# Patient Record
Sex: MALE | Race: WHITE
[De-identification: names, ages, dates, MRNs, and addresses within clinical notes are randomized per-mention and may not be internally consistent; named-entity substitution may affect disease eponyms.]

---

## 2019-08-09 ENCOUNTER — HOSPITAL ENCOUNTER (EMERGENCY)
Dept: HOSPITAL 63 - ER | Age: 24
Discharge: HOME | End: 2019-08-09
Payer: SELF-PAY

## 2019-08-09 VITALS — HEIGHT: 73 IN | WEIGHT: 185 LBS | BODY MASS INDEX: 24.52 KG/M2

## 2019-08-09 VITALS — SYSTOLIC BLOOD PRESSURE: 131 MMHG | DIASTOLIC BLOOD PRESSURE: 78 MMHG

## 2019-08-09 DIAGNOSIS — J45.901: Primary | ICD-10-CM

## 2019-08-09 DIAGNOSIS — Z87.891: ICD-10-CM

## 2019-08-09 DIAGNOSIS — F12.90: ICD-10-CM

## 2019-08-09 DIAGNOSIS — E87.6: ICD-10-CM

## 2019-08-09 LAB
ALBUMIN SERPL-MCNC: 4 G/DL (ref 3.4–5)
ALP SERPL-CCNC: 62 U/L (ref 46–116)
ALT SERPL-CCNC: 24 U/L (ref 16–63)
AMPHETAMINE/METHAMPHETAMINE: (no result)
ANION GAP SERPL CALC-SCNC: 9 MMOL/L (ref 6–14)
APTT PPP: YELLOW S
AST SERPL-CCNC: 16 U/L (ref 15–37)
BACTERIA #/AREA URNS HPF: 0 /HPF
BARBITURATES UR-MCNC: (no result) UG/ML
BASOPHILS # BLD AUTO: 0 X10^3/UL (ref 0–0.2)
BASOPHILS NFR BLD: 0 % (ref 0–3)
BENZODIAZ UR-MCNC: (no result) UG/L
BILIRUB DIRECT SERPL-MCNC: 0.1 MG/DL (ref 0–0.2)
BILIRUB SERPL-MCNC: 0.4 MG/DL (ref 0.2–1)
BILIRUB UR QL STRIP: (no result)
CA-I SERPL ISE-MCNC: 15 MG/DL (ref 8–26)
CALCIUM SERPL-MCNC: 9 MG/DL (ref 8.5–10.1)
CANNABINOIDS UR-MCNC: (no result) UG/L
CHLORIDE SERPL-SCNC: 102 MMOL/L (ref 98–107)
CO2 SERPL-SCNC: 25 MMOL/L (ref 21–32)
COCAINE UR-MCNC: (no result) NG/ML
CREAT SERPL-MCNC: 1.1 MG/DL (ref 0.7–1.3)
EOSINOPHIL NFR BLD: 0.1 X10^3/UL (ref 0–0.7)
EOSINOPHIL NFR BLD: 1 % (ref 0–3)
ERYTHROCYTE [DISTWIDTH] IN BLOOD BY AUTOMATED COUNT: 13.5 % (ref 11.5–14.5)
FIBRINOGEN PPP-MCNC: CLEAR MG/DL
GFR SERPLBLD BASED ON 1.73 SQ M-ARVRAT: 83.7 ML/MIN
GLUCOSE SERPL-MCNC: 137 MG/DL (ref 70–99)
GLUCOSE UR STRIP-MCNC: (no result) MG/DL
HCT VFR BLD CALC: 42.7 % (ref 39–53)
HGB BLD-MCNC: 14.4 G/DL (ref 13–17.5)
LIPASE: 63 U/L (ref 73–393)
LYMPHOCYTES # BLD: 1.2 X10^3/UL (ref 1–4.8)
LYMPHOCYTES NFR BLD AUTO: 17 % (ref 24–48)
MAGNESIUM SERPL-MCNC: 1.8 MG/DL (ref 1.8–2.4)
MCH RBC QN AUTO: 31 PG (ref 25–35)
MCHC RBC AUTO-ENTMCNC: 34 G/DL (ref 31–37)
MCV RBC AUTO: 90 FL (ref 79–100)
METHADONE SERPL-MCNC: (no result) NG/ML
MONO #: 0.7 X10^3/UL (ref 0–1.1)
MONOCYTES NFR BLD: 9 % (ref 0–9)
NEUT #: 5.3 X10^3UL (ref 1.8–7.7)
NEUTROPHILS NFR BLD AUTO: 73 % (ref 31–73)
NITRITE UR QL STRIP: (no result)
OPIATES UR-MCNC: (no result) NG/ML
PCP SERPL-MCNC: (no result) MG/DL
PLATELET # BLD AUTO: 192 X10^3/UL (ref 140–400)
POTASSIUM SERPL-SCNC: 3.1 MMOL/L (ref 3.5–5.1)
PROT SERPL-MCNC: 7.2 G/DL (ref 6.4–8.2)
RBC # BLD AUTO: 4.73 X10^6/UL (ref 4.3–5.7)
RBC #/AREA URNS HPF: 0 /HPF (ref 0–2)
SODIUM SERPL-SCNC: 136 MMOL/L (ref 136–145)
SP GR UR STRIP: 1.01
SQUAMOUS #/AREA URNS LPF: (no result) /LPF
UROBILINOGEN UR-MCNC: 0.2 MG/DL
WBC # BLD AUTO: 7.2 X10^3/UL (ref 4–11)
WBC #/AREA URNS HPF: 0 /HPF (ref 0–4)

## 2019-08-09 PROCEDURE — 83880 ASSAY OF NATRIURETIC PEPTIDE: CPT

## 2019-08-09 PROCEDURE — 82550 ASSAY OF CK (CPK): CPT

## 2019-08-09 PROCEDURE — 85025 COMPLETE CBC W/AUTO DIFF WBC: CPT

## 2019-08-09 PROCEDURE — 96372 THER/PROPH/DIAG INJ SC/IM: CPT

## 2019-08-09 PROCEDURE — 93005 ELECTROCARDIOGRAM TRACING: CPT

## 2019-08-09 PROCEDURE — 80307 DRUG TEST PRSMV CHEM ANLYZR: CPT

## 2019-08-09 PROCEDURE — 85610 PROTHROMBIN TIME: CPT

## 2019-08-09 PROCEDURE — 96374 THER/PROPH/DIAG INJ IV PUSH: CPT

## 2019-08-09 PROCEDURE — 94640 AIRWAY INHALATION TREATMENT: CPT

## 2019-08-09 PROCEDURE — 36415 COLL VENOUS BLD VENIPUNCTURE: CPT

## 2019-08-09 PROCEDURE — 81001 URINALYSIS AUTO W/SCOPE: CPT

## 2019-08-09 PROCEDURE — 80048 BASIC METABOLIC PNL TOTAL CA: CPT

## 2019-08-09 PROCEDURE — 99285 EMERGENCY DEPT VISIT HI MDM: CPT

## 2019-08-09 PROCEDURE — 85379 FIBRIN DEGRADATION QUANT: CPT

## 2019-08-09 PROCEDURE — 80076 HEPATIC FUNCTION PANEL: CPT

## 2019-08-09 PROCEDURE — 71046 X-RAY EXAM CHEST 2 VIEWS: CPT

## 2019-08-09 PROCEDURE — 83690 ASSAY OF LIPASE: CPT

## 2019-08-09 PROCEDURE — 85730 THROMBOPLASTIN TIME PARTIAL: CPT

## 2019-08-09 PROCEDURE — 83735 ASSAY OF MAGNESIUM: CPT

## 2019-08-09 PROCEDURE — 84484 ASSAY OF TROPONIN QUANT: CPT

## 2019-08-09 NOTE — ED.ADGEN
Past History


Past Medical History:  Asthma





Adult General


Chief Complaint


Chief Complaint


".. I recently quit smoking.. but I ve gotten more short of breath.. the last 

couple days... I have a hx of asthma.. but my inhaler .. does not seem to be 

working as well...."





HPI


HPI





Patient is a 24 year old male who presents with above hx and complaints 

increased wheezing and chest tightness last 48 hours. Patient does have a 

history of asthma.   Patient does not know what his best peak flow is. Patient 

recently stopped smoking. Patient denies any productive cough. No history of 

fever. No history of immunosuppression. No history of travel. Has not been on 

steroids recently.  Has had some upper nasal congestion which he attributed to 

allergies.  Patient reports increased tachycardia today.  Patient denies any 

history of cardiac disorders.  No hx of previous admissions for his asthma.   

Pt.  does continue to smoke marijuana.





Review of Systems


Review of Systems





Constitutional: Denies fever or chills []


Eyes: Denies change in visual acuity, redness, or eye pain []


HENT: History of nasal congestion 


Respiratory: Plaints of cough or wheezing and shortness of breath []


Cardiovascular: No additional information not addressed in HPI []


GI: Denies abdominal pain, nausea, vomiting, bloody stools or diarrhea []


: Denies dysuria or hematuria []


Musculoskeletal: Denies back pain or joint pain []


Integument: Denies rash or skin lesions []


Neurologic: Denies headache, focal weakness or sensory changes []


Endocrine: Denies polyuria or polydipsia []





All other systems were reviewed and found to be within normal limits, except as 

documented in this note.





Family History


Family History


Noncontributory





Current Medications


Current Medications





Current Medications








 Medications


  (Trade)  Dose


 Ordered  Sig/Tomasz  Start Time


 Stop Time Status Last Admin


Dose Admin


 


 Albuterol Sulfate


  (Ventolin Hfa


 Inhaler)  60 puff  STK-MED ONCE  19 23:21


 19 23:21 DC  





 


 Albuterol/


 Ipratropium


  (Duoneb)  3 ml  1X  ONCE  19 21:30


 19 21:35 DC 19 22:02


3 ML


 


 Lactated Ringer's  1,000 ml @ 


 1,000 mls/hr  Q1H  19 21:29


 19 22:28 DC 19 21:50


1,000 MLS/HR


 


 Methylprednisolone


 Sodium Succinate


  (SOLU-Medrol


 125MG VIAL)  125 mg  1X  ONCE  19 21:30


 19 21:35 DC 19 21:50


125 MG


 


 Potassium Chloride


  (Klor-Con)  40 meq  1X  ONCE  19 23:15


 19 23:21 DC 19 23:13


40 MEQ











Allergies


Allergies





Allergies








Coded Allergies Type Severity Reaction Last Updated Verified


 


  No Known Drug Allergies    19 No











Physical Exam


Physical Exam





Constitutional: Well developed, well nourished, mild-to-moderate distress, non-

toxic appearance. []


HENT: Normocephalic, atraumatic, bilateral external ears normal, oropharynx 

moist, no oral exudates, nose swollen turbinates and clear rhinorrhea


Eyes: PERRLA, EOMI, conjunctiva normal, no discharge. [] 


Neck: Normal range of motion, no tenderness, supple, no stridor. [] 


Cardiovascular: Tachycardia Heart rate regular rhythm, no murmur []


Lungs & Thorax:  Bilateral breath sounds equal apex with scattered wheezes on 

Auscultation [].  No intercostal retractions. 


Abdomen: Bowel sounds normal, soft, no tenderness, no masses, no pulsatile 

masses. [] 


Skin: Warm, dry, no erythema, no rash. [] 


Back: No tenderness, no CVA tenderness. [] 


Extremities: No tenderness, no cyanosis, no clubbing, ROM intact, no edema. [] 


Neurologic: Alert and oriented X 3, normal motor function, normal sensory 

function, no focal deficits noted. []


Psychologic: Affect anxious, judgement normal, mood normal. []





Current Patient Data


Vital Signs





                                   Vital Signs








  Date Time  Temp Pulse Resp B/P (MAP) Pulse Ox O2 Delivery O2 Flow Rate FiO2


 


19 22:08      Room Air  


 


19 21:26 97.9 110 18  97   








Lab Results





                                Laboratory Tests








Test


 19


21:35 19


22:55


 


White Blood Count


 7.2 x10^3/uL


(4.0-11.0) 





 


Red Blood Count


 4.73 x10^6/uL


(4.30-5.70) 





 


Hemoglobin


 14.4 g/dL


(13.0-17.5) 





 


Hematocrit


 42.7 %


(39.0-53.0) 





 


Mean Corpuscular Volume


 90 fL ()


 





 


Mean Corpuscular Hemoglobin 31 pg (25-35)   


 


Mean Corpuscular Hemoglobin


Concent 34 g/dL


(31-37) 





 


Red Cell Distribution Width


 13.5 %


(11.5-14.5) 





 


Platelet Count


 192 x10^3/uL


(140-400) 





 


Neutrophils (%) (Auto) 73 % (31-73)   


 


Lymphocytes (%) (Auto) 17 % (24-48)  L 


 


Monocytes (%) (Auto) 9 % (0-9)   


 


Eosinophils (%) (Auto) 1 % (0-3)   


 


Basophils (%) (Auto) 0 % (0-3)   


 


Neutrophils # (Auto)


 5.3 x10^3uL


(1.8-7.7) 





 


Lymphocytes # (Auto)


 1.2 x10^3/uL


(1.0-4.8) 





 


Monocytes # (Auto)


 0.7 x10^3/uL


(0.0-1.1) 





 


Eosinophils # (Auto)


 0.1 x10^3/uL


(0.0-0.7) 





 


Basophils # (Auto)


 0.0 x10^3/uL


(0.0-0.2) 





 


Prothrombin Time


 10.8 SEC


(9.4-11.4) 





 


Prothrombin Time INR 1.0 (0.9-1.1)   


 


Activated Partial


Thromboplast Time 25 SEC (23-33)


 





 


D-Dimer (Cheryl)


 < 0.19 mg/L


(0.00-0.50) 





 


Sodium Level


 136 mmol/L


(136-145) 





 


Potassium Level


 3.1 mmol/L


(3.5-5.1)  L 





 


Chloride Level


 102 mmol/L


() 





 


Carbon Dioxide Level


 25 mmol/L


(21-32) 





 


Anion Gap 9 (6-14)   


 


Blood Urea Nitrogen


 15 mg/dL


(8-26) 





 


Creatinine


 1.1 mg/dL


(0.7-1.3) 





 


Estimated GFR


(Cockcroft-Gault) 83.7  


 





 


Glucose Level


 137 mg/dL


(70-99)  H 





 


Calcium Level


 9.0 mg/dL


(8.5-10.1) 





 


Magnesium Level


 1.8 mg/dL


(1.8-2.4) 





 


Total Bilirubin


 0.4 mg/dL


(0.2-1.0) 





 


Direct Bilirubin


 0.1 mg/dL


(0.0-0.2) 





 


Aspartate Amino Transferase


(AST) 16 U/L (15-37)


 





 


Alanine Aminotransferase (ALT)


 24 U/L (16-63)


 





 


Alkaline Phosphatase


 62 U/L


() 





 


Creatine Kinase


 121 U/L


() 





 


Troponin I Quantitative


 < 0.017 ng/mL


(0-0.055) 





 


NT-Pro-B-Type Natriuretic


Peptide 62 pg/mL


(0-124) 





 


Total Protein


 7.2 g/dL


(6.4-8.2) 





 


Albumin


 4.0 g/dL


(3.4-5.0) 





 


Lipase


 63 U/L


()  L 





 


Urine Collection Type  Unknown  


 


Urine Color  Yellow  


 


Urine Clarity  Clear  


 


Urine pH  6.5  


 


Urine Specific Gravity  1.010  


 


Urine Protein


 


 Neg


(NEG-TRACE)


 


Urine Glucose (UA)


 


 Neg mg/dL


(NEG)


 


Urine Ketones (Stick)


 


 Neg mg/dL


(NEG)


 


Urine Blood  Neg (NEG)  


 


Urine Nitrite  Neg (NEG)  


 


Urine Bilirubin  Neg (NEG)  


 


Urine Urobilinogen Dipstick


 


 0.2 mg/dL (0.2


mg/dL)


 


Urine Leukocyte Esterase  Neg (NEG)  


 


Urine RBC  0 /HPF (0-2)  


 


Urine WBC  0 /HPF (0-4)  


 


Urine Squamous Epithelial


Cells 


 Occ /LPF  





 


Urine Transitional Epithelial


Cells 


 Occ /LPF  





 


Urine Bacteria


 


 0 /HPF (0-FEW)





 


Urine Opiates Screen  Neg (NEG)  


 


Urine Methadone Screen  Neg (NEG)  


 


Urine Barbiturates  Neg (NEG)  


 


Urine Phencyclidine Screen  Neg (NEG)  


 


Urine


Amphetamine/Methamphetamine 


 Neg (NEG)  





 


Urine Benzodiazepines Screen  Neg (NEG)  


 


Urine Cocaine Screen  Neg (NEG)  


 


Urine Cannabinoids Screen  Pos (NEG)  


 


Urine Ethyl Alcohol  Neg (NEG)  











EKG


EKG


Interpretation EKG shows a sinus tachycardia 109 bpm. No findings acute STEMI. 

No finding[]





Radiology/Procedures


Radiology/Procedures


[][facility] [address], [place] [address] [phone]


                                        


                                 IMAGING REPORT





                                     Signed





PATIENT: KALYANI BILLS  ACCOUNT: [id number]     MRN#: [id number]


: 1995           LOCATION: ER              AGE: 24


SEX: M                    EXAM DT: 19         ACCESSION#: [id number]


STATUS: REG ER            ORD. PHYSICIAN: EDGAR GARCIA MD


REASON: Dyspnea, Hx asthma,recently quit smoking


PROCEDURE: CHEST PA & LATERAL





Chest radiograph 2019 9:44 PM


 


INDICATION: Dyspnea


 


COMPARISON: None available


 


TECHNIQUE: Frontal and lateral views of the chest are provided.


 


FINDINGS:


 


The cardiomediastinal silhouette is within normal limits.


 


There are no pleural effusions. There is no pulmonary vascular congestion.


There is no pneumothorax.


 


The lungs are clear.


 


No significant osseous abnormality is identified.


 


IMPRESSION:


 


No acute cardiopulmonary process.


 


Electronically signed by: Santiago Rojas MD (2019 10:52 PM) 


[facility]














DICTATED AND SIGNED BY:     SANTIAGO ROJAS MD


DATE:     19 1712





CC: EDGAR GARCIA MD; PCP,NO ~





Course & Med Decision Making


Course & Med Decision Making


Pertinent Labs and Imaging studies reviewed. (See chart for details)








Patient reports marked improvement of symptoms at time of discharge. Patient 

take prednisone 50 mg day for 5 days. Patient uses MDI 2 puffs 4 times a day. 

Patient follow-up primary care. Patient return of any concerns. Patient push 

fruit juices. Patient continues or persists in not smoking.





[]





Final Impression


Final Impression


1. Asthma exacerbation


2. Hypokalemia[]3.1


3. Marijuana use


4. Recently stopped smoking tobacco.





Dragon Disclaimer


Dragon Disclaimer


This electronic medical record was generated, in whole or in part, using a voice

 recognition dictation system.





Dragon Disclaimer


This chart was dictated in whole or in part using Voice Recognition software in 

a busy, high-work load, and often noisy Emergency Department environment.  It 

may contain unintended and wholly unrecognized errors or omissions.





Dragon Disclaimer


This chart was dictated in whole or in part using Voice Recognition software in 

a busy, high-work load, and often noisy Emergency Department environment.  It 

may contain unintended and wholly unrecognized errors or omissions.











EDGAR GARCIA MD            Aug 9, 2019 21:17

## 2019-08-09 NOTE — RAD
Chest radiograph 8/9/2019 9:44 PM

 

INDICATION: Dyspnea

 

COMPARISON: None available

 

TECHNIQUE: Frontal and lateral views of the chest are provided.

 

FINDINGS:

 

The cardiomediastinal silhouette is within normal limits.

 

There are no pleural effusions. There is no pulmonary vascular congestion.

There is no pneumothorax.

 

The lungs are clear.

 

No significant osseous abnormality is identified.

 

IMPRESSION:

 

No acute cardiopulmonary process.

 

Electronically signed by: Soumya Rojas MD (8/9/2019 10:52 PM) 

[facility]

## 2019-08-10 ENCOUNTER — HOSPITAL ENCOUNTER (EMERGENCY)
Dept: HOSPITAL 63 - ER | Age: 24
LOS: 1 days | Discharge: HOME | End: 2019-08-11
Payer: SELF-PAY

## 2019-08-10 VITALS — BODY MASS INDEX: 24.52 KG/M2 | HEIGHT: 73 IN | WEIGHT: 185 LBS

## 2019-08-10 VITALS — SYSTOLIC BLOOD PRESSURE: 157 MMHG | DIASTOLIC BLOOD PRESSURE: 84 MMHG

## 2019-08-10 DIAGNOSIS — F17.210: ICD-10-CM

## 2019-08-10 DIAGNOSIS — J45.901: Primary | ICD-10-CM

## 2019-08-10 DIAGNOSIS — E87.6: ICD-10-CM

## 2019-08-10 DIAGNOSIS — D72.829: ICD-10-CM

## 2019-08-10 DIAGNOSIS — F12.10: ICD-10-CM

## 2019-08-10 PROCEDURE — 96375 TX/PRO/DX INJ NEW DRUG ADDON: CPT

## 2019-08-10 PROCEDURE — 85730 THROMBOPLASTIN TIME PARTIAL: CPT

## 2019-08-10 PROCEDURE — 84484 ASSAY OF TROPONIN QUANT: CPT

## 2019-08-10 PROCEDURE — 80048 BASIC METABOLIC PNL TOTAL CA: CPT

## 2019-08-10 PROCEDURE — 85610 PROTHROMBIN TIME: CPT

## 2019-08-10 PROCEDURE — 99285 EMERGENCY DEPT VISIT HI MDM: CPT

## 2019-08-10 PROCEDURE — 82550 ASSAY OF CK (CPK): CPT

## 2019-08-10 PROCEDURE — 80307 DRUG TEST PRSMV CHEM ANLYZR: CPT

## 2019-08-10 PROCEDURE — 80076 HEPATIC FUNCTION PANEL: CPT

## 2019-08-10 PROCEDURE — 36415 COLL VENOUS BLD VENIPUNCTURE: CPT

## 2019-08-10 PROCEDURE — 96365 THER/PROPH/DIAG IV INF INIT: CPT

## 2019-08-10 PROCEDURE — 85379 FIBRIN DEGRADATION QUANT: CPT

## 2019-08-10 PROCEDURE — 93005 ELECTROCARDIOGRAM TRACING: CPT

## 2019-08-10 PROCEDURE — 85025 COMPLETE CBC W/AUTO DIFF WBC: CPT

## 2019-08-10 PROCEDURE — 94640 AIRWAY INHALATION TREATMENT: CPT

## 2019-08-10 PROCEDURE — 81001 URINALYSIS AUTO W/SCOPE: CPT

## 2019-08-10 PROCEDURE — 83690 ASSAY OF LIPASE: CPT

## 2019-08-10 PROCEDURE — 83735 ASSAY OF MAGNESIUM: CPT

## 2019-08-10 PROCEDURE — 83880 ASSAY OF NATRIURETIC PEPTIDE: CPT

## 2019-08-10 NOTE — EKG
[facility] [address], [place] [address]

Test Date:    2019               Test Time:    21:27:32

Pat Name:     KALYANI BILLS            Department:   

Patient ID:   [id number]           Room:          

Gender:       M                        Technician:   EMILI

:          1995               Requested By: EDGAR GARCIA

Order Number: [id number]            Reading MD:   Priyank Upton MD

                                 Measurements

Intervals                              Axis          

Rate:         109                      P:            43

HI:           154                      QRS:          75

QRSD:         100                      T:            24

QT:           306                                    

QTc:          414                                    

                           Interpretive Statements

SINUS TACHYCARDIA



Electronically Signed On 2019 7:50:44 CDT by Priyank Upton MD

## 2019-08-11 ENCOUNTER — HOSPITAL ENCOUNTER (INPATIENT)
Dept: HOSPITAL 63 - ER | Age: 24
LOS: 1 days | Discharge: HOME | DRG: 392 | End: 2019-08-12
Attending: INTERNAL MEDICINE | Admitting: INTERNAL MEDICINE
Payer: SELF-PAY

## 2019-08-11 VITALS — SYSTOLIC BLOOD PRESSURE: 126 MMHG | DIASTOLIC BLOOD PRESSURE: 74 MMHG

## 2019-08-11 VITALS — BODY MASS INDEX: 23.23 KG/M2 | HEIGHT: 73 IN | WEIGHT: 175.25 LBS

## 2019-08-11 VITALS — DIASTOLIC BLOOD PRESSURE: 70 MMHG | SYSTOLIC BLOOD PRESSURE: 116 MMHG

## 2019-08-11 VITALS — SYSTOLIC BLOOD PRESSURE: 124 MMHG | DIASTOLIC BLOOD PRESSURE: 73 MMHG

## 2019-08-11 DIAGNOSIS — K21.9: ICD-10-CM

## 2019-08-11 DIAGNOSIS — J45.901: ICD-10-CM

## 2019-08-11 DIAGNOSIS — K29.70: Primary | ICD-10-CM

## 2019-08-11 DIAGNOSIS — G43.A0: ICD-10-CM

## 2019-08-11 DIAGNOSIS — F42.8: ICD-10-CM

## 2019-08-11 DIAGNOSIS — F12.90: ICD-10-CM

## 2019-08-11 DIAGNOSIS — F41.9: ICD-10-CM

## 2019-08-11 DIAGNOSIS — F90.9: ICD-10-CM

## 2019-08-11 DIAGNOSIS — Z87.891: ICD-10-CM

## 2019-08-11 DIAGNOSIS — F31.9: ICD-10-CM

## 2019-08-11 LAB
ALBUMIN SERPL-MCNC: 3.9 G/DL (ref 3.4–5)
ALBUMIN SERPL-MCNC: 4.2 G/DL (ref 3.4–5)
ALP SERPL-CCNC: 58 U/L (ref 46–116)
ALP SERPL-CCNC: 63 U/L (ref 46–116)
ALT SERPL-CCNC: 26 U/L (ref 16–63)
ALT SERPL-CCNC: 28 U/L (ref 16–63)
AMPHETAMINE/METHAMPHETAMINE: (no result)
ANION GAP SERPL CALC-SCNC: 10 MMOL/L (ref 6–14)
ANION GAP SERPL CALC-SCNC: 11 MMOL/L (ref 6–14)
APTT PPP: (no result) S
AST SERPL-CCNC: 11 U/L (ref 15–37)
AST SERPL-CCNC: 12 U/L (ref 15–37)
BACTERIA #/AREA URNS HPF: (no result) /HPF
BARBITURATES UR-MCNC: (no result) UG/ML
BASOPHILS # BLD AUTO: 0 X10^3/UL (ref 0–0.2)
BASOPHILS # BLD AUTO: 0 X10^3/UL (ref 0–0.2)
BASOPHILS NFR BLD: 0 % (ref 0–3)
BASOPHILS NFR BLD: 0 % (ref 0–3)
BENZODIAZ UR-MCNC: (no result) UG/L
BILIRUB DIRECT SERPL-MCNC: 0.1 MG/DL (ref 0–0.2)
BILIRUB DIRECT SERPL-MCNC: 0.1 MG/DL (ref 0–0.2)
BILIRUB SERPL-MCNC: 0.5 MG/DL (ref 0.2–1)
BILIRUB SERPL-MCNC: 0.5 MG/DL (ref 0.2–1)
BILIRUB UR QL STRIP: (no result)
CA-I SERPL ISE-MCNC: 14 MG/DL (ref 8–26)
CA-I SERPL ISE-MCNC: 14 MG/DL (ref 8–26)
CALCIUM SERPL-MCNC: 9.1 MG/DL (ref 8.5–10.1)
CALCIUM SERPL-MCNC: 9.9 MG/DL (ref 8.5–10.1)
CANNABINOIDS UR-MCNC: (no result) UG/L
CHLORIDE SERPL-SCNC: 104 MMOL/L (ref 98–107)
CHLORIDE SERPL-SCNC: 106 MMOL/L (ref 98–107)
CO2 SERPL-SCNC: 25 MMOL/L (ref 21–32)
CO2 SERPL-SCNC: 27 MMOL/L (ref 21–32)
COCAINE UR-MCNC: (no result) NG/ML
CREAT SERPL-MCNC: 1 MG/DL (ref 0.7–1.3)
CREAT SERPL-MCNC: 1 MG/DL (ref 0.7–1.3)
EOSINOPHIL NFR BLD: 0 % (ref 0–3)
EOSINOPHIL NFR BLD: 0 % (ref 0–3)
EOSINOPHIL NFR BLD: 0 X10^3/UL (ref 0–0.7)
EOSINOPHIL NFR BLD: 0 X10^3/UL (ref 0–0.7)
ERYTHROCYTE [DISTWIDTH] IN BLOOD BY AUTOMATED COUNT: 13.6 % (ref 11.5–14.5)
ERYTHROCYTE [DISTWIDTH] IN BLOOD BY AUTOMATED COUNT: 13.7 % (ref 11.5–14.5)
FIBRINOGEN PPP-MCNC: CLEAR MG/DL
GFR SERPLBLD BASED ON 1.73 SQ M-ARVRAT: 93.4 ML/MIN
GFR SERPLBLD BASED ON 1.73 SQ M-ARVRAT: 93.4 ML/MIN
GLUCOSE SERPL-MCNC: 103 MG/DL (ref 70–99)
GLUCOSE SERPL-MCNC: 118 MG/DL (ref 70–99)
GLUCOSE UR STRIP-MCNC: (no result) MG/DL
HCT VFR BLD CALC: 42.9 % (ref 39–53)
HCT VFR BLD CALC: 45.9 % (ref 39–53)
HGB BLD-MCNC: 14.5 G/DL (ref 13–17.5)
HGB BLD-MCNC: 15.3 G/DL (ref 13–17.5)
LIPASE: 184 U/L (ref 73–393)
LYMPHOCYTES # BLD: 0.6 X10^3/UL (ref 1–4.8)
LYMPHOCYTES # BLD: 2.2 X10^3/UL (ref 1–4.8)
LYMPHOCYTES NFR BLD AUTO: 19 % (ref 24–48)
LYMPHOCYTES NFR BLD AUTO: 7 % (ref 24–48)
MAGNESIUM SERPL-MCNC: 1.9 MG/DL (ref 1.8–2.4)
MCH RBC QN AUTO: 30 PG (ref 25–35)
MCH RBC QN AUTO: 31 PG (ref 25–35)
MCHC RBC AUTO-ENTMCNC: 33 G/DL (ref 31–37)
MCHC RBC AUTO-ENTMCNC: 34 G/DL (ref 31–37)
MCV RBC AUTO: 91 FL (ref 79–100)
MCV RBC AUTO: 91 FL (ref 79–100)
METHADONE SERPL-MCNC: (no result) NG/ML
MONO #: 0.1 X10^3/UL (ref 0–1.1)
MONO #: 1.1 X10^3/UL (ref 0–1.1)
MONOCYTES NFR BLD: 1 % (ref 0–9)
MONOCYTES NFR BLD: 10 % (ref 0–9)
NEUT #: 8.3 X10^3UL (ref 1.8–7.7)
NEUT #: 8.5 X10^3UL (ref 1.8–7.7)
NEUTROPHILS NFR BLD AUTO: 71 % (ref 31–73)
NEUTROPHILS NFR BLD AUTO: 92 % (ref 31–73)
NITRITE UR QL STRIP: (no result)
OPIATES UR-MCNC: (no result) NG/ML
PCP SERPL-MCNC: (no result) MG/DL
PLATELET # BLD AUTO: 206 X10^3/UL (ref 140–400)
PLATELET # BLD AUTO: 218 X10^3/UL (ref 140–400)
PLATELET # BLD EST: ADEQUATE 10*3/UL
POTASSIUM SERPL-SCNC: 3.2 MMOL/L (ref 3.5–5.1)
POTASSIUM SERPL-SCNC: 3.6 MMOL/L (ref 3.5–5.1)
PROT SERPL-MCNC: 7 G/DL (ref 6.4–8.2)
PROT SERPL-MCNC: 7.3 G/DL (ref 6.4–8.2)
RBC # BLD AUTO: 4.69 X10^6/UL (ref 4.3–5.7)
RBC # BLD AUTO: 5.07 X10^6/UL (ref 4.3–5.7)
RBC #/AREA URNS HPF: 0 /HPF (ref 0–2)
SODIUM SERPL-SCNC: 140 MMOL/L (ref 136–145)
SODIUM SERPL-SCNC: 143 MMOL/L (ref 136–145)
SP GR UR STRIP: 1.01
SQUAMOUS #/AREA URNS LPF: (no result) /LPF
UROBILINOGEN UR-MCNC: 0.2 MG/DL
WBC # BLD AUTO: 11.7 X10^3/UL (ref 4–11)
WBC # BLD AUTO: 9.3 X10^3/UL (ref 4–11)
WBC #/AREA URNS HPF: (no result) /HPF (ref 0–4)

## 2019-08-11 PROCEDURE — 80076 HEPATIC FUNCTION PANEL: CPT

## 2019-08-11 PROCEDURE — 96374 THER/PROPH/DIAG INJ IV PUSH: CPT

## 2019-08-11 PROCEDURE — 85610 PROTHROMBIN TIME: CPT

## 2019-08-11 PROCEDURE — 94640 AIRWAY INHALATION TREATMENT: CPT

## 2019-08-11 PROCEDURE — 80053 COMPREHEN METABOLIC PANEL: CPT

## 2019-08-11 PROCEDURE — 96361 HYDRATE IV INFUSION ADD-ON: CPT

## 2019-08-11 PROCEDURE — 86850 RBC ANTIBODY SCREEN: CPT

## 2019-08-11 PROCEDURE — 86901 BLOOD TYPING SEROLOGIC RH(D): CPT

## 2019-08-11 PROCEDURE — 80048 BASIC METABOLIC PNL TOTAL CA: CPT

## 2019-08-11 PROCEDURE — 93005 ELECTROCARDIOGRAM TRACING: CPT

## 2019-08-11 PROCEDURE — 85730 THROMBOPLASTIN TIME PARTIAL: CPT

## 2019-08-11 PROCEDURE — 96375 TX/PRO/DX INJ NEW DRUG ADDON: CPT

## 2019-08-11 PROCEDURE — 36415 COLL VENOUS BLD VENIPUNCTURE: CPT

## 2019-08-11 PROCEDURE — 85025 COMPLETE CBC W/AUTO DIFF WBC: CPT

## 2019-08-11 PROCEDURE — 86900 BLOOD TYPING SEROLOGIC ABO: CPT

## 2019-08-11 PROCEDURE — 85027 COMPLETE CBC AUTOMATED: CPT

## 2019-08-11 RX ADMIN — AZITHROMYCIN SCH MG: 250 TABLET, FILM COATED ORAL at 08:27

## 2019-08-11 RX ADMIN — SODIUM CHLORIDE, SODIUM LACTATE, POTASSIUM CHLORIDE, AND CALCIUM CHLORIDE SCH MLS/HR: .6; .31; .03; .02 INJECTION, SOLUTION INTRAVENOUS at 19:22

## 2019-08-11 RX ADMIN — FAMOTIDINE SCH MG: 10 INJECTION, SOLUTION INTRAVENOUS at 08:28

## 2019-08-11 RX ADMIN — IPRATROPIUM BROMIDE AND ALBUTEROL SULFATE SCH ML: .5; 3 SOLUTION RESPIRATORY (INHALATION) at 09:25

## 2019-08-11 RX ADMIN — ONDANSETRON PRN MG: 2 INJECTION INTRAMUSCULAR; INTRAVENOUS at 08:21

## 2019-08-11 RX ADMIN — IPRATROPIUM BROMIDE AND ALBUTEROL SULFATE SCH ML: .5; 3 SOLUTION RESPIRATORY (INHALATION) at 15:23

## 2019-08-11 RX ADMIN — SODIUM CHLORIDE, SODIUM LACTATE, POTASSIUM CHLORIDE, AND CALCIUM CHLORIDE SCH MLS/HR: .6; .31; .03; .02 INJECTION, SOLUTION INTRAVENOUS at 12:15

## 2019-08-11 RX ADMIN — IPRATROPIUM BROMIDE AND ALBUTEROL SULFATE SCH ML: .5; 3 SOLUTION RESPIRATORY (INHALATION) at 05:58

## 2019-08-11 RX ADMIN — IPRATROPIUM BROMIDE AND ALBUTEROL SULFATE SCH ML: .5; 3 SOLUTION RESPIRATORY (INHALATION) at 20:00

## 2019-08-11 RX ADMIN — ONDANSETRON PRN MG: 2 INJECTION INTRAMUSCULAR; INTRAVENOUS at 15:38

## 2019-08-11 RX ADMIN — PANTOPRAZOLE SODIUM SCH MG: 40 TABLET, DELAYED RELEASE ORAL at 15:38

## 2019-08-11 RX ADMIN — METHYLPREDNISOLONE SODIUM SUCCINATE SCH MG: 40 INJECTION, POWDER, FOR SOLUTION INTRAMUSCULAR; INTRAVENOUS at 08:27

## 2019-08-11 RX ADMIN — SODIUM CHLORIDE, SODIUM LACTATE, POTASSIUM CHLORIDE, AND CALCIUM CHLORIDE SCH MLS/HR: .6; .31; .03; .02 INJECTION, SOLUTION INTRAVENOUS at 06:00

## 2019-08-11 NOTE — ED.ADGEN
Past History


Past Medical History:  Asthma


Past Surgical History:  No Surgical History


Smoking:  Cigarettes


Alcohol Use:  Occasionally


Drug Use:  Marijuana





Adult General


Chief Complaint


Chief Complaint


".. I was here  yesterday.. I am still really short of breath.. no better. "





HPI


HPI





Patient is a 24 year old male who presents with  history of asthma exacerbation.

 Patient has continued to smoke. Patient has been taking prednisone to directed.

Has been using his MDI 2 puffs every 2-4 hours.   Patient reports increased 

exacerbation of his asthma tonight. Nonproductive cough.  No recent travel or 

specific ill contacts.





Review of Systems


Review of Systems





Constitutional: Denies fever or chills []


Eyes: Denies change in visual acuity, redness, or eye pain []


HENT: Denies nasal congestion or sore throat []


Respiratory: Complaints  of coughing and wheezing


Cardiovascular: No additional information not addressed in HPI []


GI: Denies abdominal pain, nausea, vomiting, bloody stools or diarrhea []


: Denies dysuria or hematuria []


Musculoskeletal: Denies back pain or joint pain []


Integument: Denies rash or skin lesions []


Neurologic: Denies headache, focal weakness or sensory changes []


Endocrine: Denies polyuria or polydipsia []





All other systems were reviewed and found to be within normal limits, except as 

documented in this note.





Family History


Family History


Noncontributory





Current Medications


Current Medications





Current Medications








 Medications


  (Trade)  Dose


 Ordered  Sig/Tomasz  Start Time


 Stop Time Status Last Admin


Dose Admin


 


 Albuterol/


 Ipratropium


  (Duoneb)  3 ml  1X  ONCE  8/11/19 00:30


 8/11/19 01:04 DC 8/11/19 00:31


3 ML


 


 Azithromycin


  (Zithromax)  500 mg  1X  ONCE  8/11/19 03:00


 8/11/19 03:01 DC 8/11/19 03:17


500 MG


 


 Lactated Ringer's  1,000 ml @ 


 1,000 mls/hr  Q1H  8/11/19 00:26


 8/11/19 01:25 DC 8/11/19 00:58


1,000 MLS/HR


 


 Magnesium Sulfate  50 ml @ 25


 mls/hr  1X  ONCE  8/11/19 00:30


 8/11/19 02:29 DC 8/11/19 00:58


25 MLS/HR


 


 Methylprednisolone


 Sodium Succinate


  (SOLU-Medrol


 125MG VIAL)  125 mg  1X  ONCE  8/11/19 00:30


 8/11/19 01:04 DC 8/11/19 00:58


125 MG


 


 Nicotine


  (Nicoderm Cq


 21mg)  1 patch  1X  ONCE  8/11/19 03:00


 8/11/19 03:01 DC 8/11/19 03:17


1 PATCH


 


 Potassium Chloride


  (Klor-Con)  40 meq  1X  ONCE  8/11/19 03:00


 8/11/19 03:01 DC 8/11/19 03:17


40 MEQ











Allergies


Allergies





Allergies








Coded Allergies Type Severity Reaction Last Updated Verified


 


  No Known Drug Allergies    8/9/19 No











Physical Exam


Physical Exam





Constitutional: Moderate acute distress, non-toxic appearance. []


HENT: Normocephalic, atraumatic, bilateral external ears normal, oropharynx 

moist, no oral exudates, nose normal. []


Eyes: PERRLA, EOMI, conjunctiva normal, no discharge. [] 


Neck: Normal range of motion, no tenderness, supple, no stridor. [] 


Cardiovascular: Tachycardia Heart rate regular rhythm, no murmur []


Lungs & Thorax:  Bilateral breath sounds with apex with scattered wheezing 

throughout. On Auscultation [].  No intercostal retractions.


Abdomen: Bowel sounds normal, soft, no tenderness, no masses, no pulsatile 

masses. [] 


Skin: Warm, dry, no erythema, no rash. [] 


Back: No tenderness, no CVA tenderness. [] 


Extremities: No tenderness, no cyanosis, no clubbing, ROM intact, no edema. [] 


Neurologic: Alert and oriented X 3, normal motor function, normal sensory fun

ction, no focal deficits noted. []


Psychologic: Affect anxious, judgement normal, mood normal. []





Current Patient Data


Vital Signs





                                   Vital Signs








  Date Time  Temp Pulse Resp B/P (MAP) Pulse Ox O2 Delivery O2 Flow Rate FiO2


 


8/11/19 00:33     98 Room Air  


 


8/10/19 23:47 97.7 99 18     








Lab Results





                                Laboratory Tests








Test


 8/11/19


00:45 8/11/19


01:34


 


White Blood Count


 11.7 x10^3/uL


(4.0-11.0)  H 





 


Red Blood Count


 5.07 x10^6/uL


(4.30-5.70) 





 


Hemoglobin


 15.3 g/dL


(13.0-17.5) 





 


Hematocrit


 45.9 %


(39.0-53.0) 





 


Mean Corpuscular Volume


 91 fL ()


 





 


Mean Corpuscular Hemoglobin 30 pg (25-35)   


 


Mean Corpuscular Hemoglobin


Concent 33 g/dL


(31-37) 





 


Red Cell Distribution Width


 13.7 %


(11.5-14.5) 





 


Platelet Count


 218 x10^3/uL


(140-400) 





 


Neutrophils (%) (Auto) 71 % (31-73)   


 


Lymphocytes (%) (Auto) 19 % (24-48)  L 


 


Monocytes (%) (Auto) 10 % (0-9)  H 


 


Eosinophils (%) (Auto) 0 % (0-3)   


 


Basophils (%) (Auto) 0 % (0-3)   


 


Neutrophils # (Auto)


 8.3 x10^3uL


(1.8-7.7)  H 





 


Lymphocytes # (Auto)


 2.2 x10^3/uL


(1.0-4.8) 





 


Monocytes # (Auto)


 1.1 x10^3/uL


(0.0-1.1) 





 


Eosinophils # (Auto)


 0.0 x10^3/uL


(0.0-0.7) 





 


Basophils # (Auto)


 0.0 x10^3/uL


(0.0-0.2) 





 


Platelet Estimate


 Adequate


(ADEQUATE) 





 


Large Platelets Few   


 


Giant Platelets Occ   


 


Prothrombin Time


 11.8 SEC


(9.4-11.4)  H 





 


Prothrombin Time INR 1.1 (0.9-1.1)   


 


Activated Partial


Thromboplast Time 24 SEC (23-33)


 





 


D-Dimer (Cheryl)


 < 0.19 mg/L


(0.00-0.50) 





 


Sodium Level


 140 mmol/L


(136-145) 





 


Potassium Level


 3.2 mmol/L


(3.5-5.1)  L 





 


Chloride Level


 104 mmol/L


() 





 


Carbon Dioxide Level


 25 mmol/L


(21-32) 





 


Anion Gap 11 (6-14)   


 


Blood Urea Nitrogen


 14 mg/dL


(8-26) 





 


Creatinine


 1.0 mg/dL


(0.7-1.3) 





 


Estimated GFR


(Cockcroft-Gault) 93.4  


 





 


Glucose Level


 103 mg/dL


(70-99)  H 





 


Calcium Level


 9.9 mg/dL


(8.5-10.1) 





 


Magnesium Level


 1.9 mg/dL


(1.8-2.4) 





 


Total Bilirubin


 0.5 mg/dL


(0.2-1.0) 





 


Direct Bilirubin


 0.1 mg/dL


(0.0-0.2) 





 


Aspartate Amino Transferase


(AST) 12 U/L (15-37)


L 





 


Alanine Aminotransferase (ALT)


 28 U/L (16-63)


 





 


Alkaline Phosphatase


 63 U/L


() 





 


Creatine Kinase


 91 U/L


() 





 


Troponin I Quantitative


 < 0.017 ng/mL


(0-0.055) 





 


NT-Pro-B-Type Natriuretic


Peptide 137 pg/mL


(0-124)  H 





 


Total Protein


 7.3 g/dL


(6.4-8.2) 





 


Albumin


 4.2 g/dL


(3.4-5.0) 





 


Lipase


 184 U/L


() 





 


Urine Collection Type  Unknown  


 


Urine Color  Straw  


 


Urine Clarity  Clear  


 


Urine pH  6.0  


 


Urine Specific Gravity  1.010  


 


Urine Protein


 


 Neg


(NEG-TRACE)


 


Urine Glucose (UA)


 


 Neg mg/dL


(NEG)


 


Urine Ketones (Stick)


 


 Neg mg/dL


(NEG)


 


Urine Blood  Neg (NEG)  


 


Urine Nitrite  Neg (NEG)  


 


Urine Bilirubin  Neg (NEG)  


 


Urine Urobilinogen Dipstick


 


 0.2 mg/dL (0.2


mg/dL)


 


Urine Leukocyte Esterase  Neg (NEG)  


 


Urine RBC  0 /HPF (0-2)  


 


Urine WBC


 


 Occ /HPF (0-4)





 


Urine Squamous Epithelial


Cells 


 Occ /LPF  





 


Urine Bacteria


 


 Few /HPF


(0-FEW)


 


Urine Opiates Screen  Neg (NEG)  


 


Urine Methadone Screen  Neg (NEG)  


 


Urine Barbiturates  Neg (NEG)  


 


Urine Phencyclidine Screen  Neg (NEG)  


 


Urine


Amphetamine/Methamphetamine 


 Neg (NEG)  





 


Urine Benzodiazepines Screen  Neg (NEG)  


 


Urine Cocaine Screen  Neg (NEG)  


 


Urine Cannabinoids Screen  Pos (NEG)  


 


Urine Ethyl Alcohol  Neg (NEG)  











EKG


EKG


I interpretation EKG shows a sinus rhythm at 78 bpm. No acute pathology[]





Radiology/Procedures


Radiology/Procedures


Reviewed chest x-ray from yesterday. No significant infiltrate[]





Course & Med Decision Making


Course & Med Decision Making


Pertinent Labs and Imaging studies reviewed. (See chart for details)





Patient's symptoms improved clinically at time of discharge. Patient refused 

admission on this visit. Patient to stop any type smoking.  Pt.  take meds as 

previous directed. Will add Zithromax 250 mg daily for 5 days. Patient return if

 any concerns.. Must stop smoking. Will have nicotine patch placed. 





[]





Final Impression


Final Impression


1. Asthma Exacerbation[]


2. Tobacco and marijuana use


3. Mild leukocytosis of 11.7


4. Mild hypokalemia 3.2  ( suspect intracellular shift due to frequent 

treatments)


5. Out pt treatment failure





Dragon Disclaimer


Dragon Disclaimer


This electronic medical record was generated, in whole or in part, using a voice

 recognition dictation system.





Dragon Disclaimer


This chart was dictated in whole or in part using Voice Recognition software in 

a busy, high-work load, and often noisy Emergency Department environment.  It 

may contain unintended and wholly unrecognized errors or omissions.





Dragon Disclaimer


This chart was dictated in whole or in part using Voice Recognition software in 

a busy, high-work load, and often noisy Emergency Department environment.  It 

may contain unintended and wholly unrecognized errors or omissions.





Dragon Disclaimer


This chart was dictated in whole or in part using Voice Recognition software in 

a busy, high-work load, and often noisy Emergency Department environment.  It 

may contain unintended and wholly unrecognized errors or omissions.











EDGAR GARCIA MD           Aug 11, 2019 00:26

## 2019-08-11 NOTE — HP
ADMIT DATE:  08/11/2019



HISTORY OF PRESENT ILLNESS:  The patient is a 24-year-old  male patient

who came to the Emergency Room with chest tightness, wheezing, has also

shortness of breath and vomiting.  He was evaluated twice before for asthma

exacerbation, refused admission and now he wants to be admitted.  He

discontinued his nicotine patch and has not been using any nicotine substitute. 

He stated that he wants to stop all nicotine products.  The patient did vomit

once after getting home.  He was extensively investigated.  He was admitted with

asthma exacerbation, nausea, vomiting, probable gastritis, history of tobacco

and marijuana use.



PAST MEDICAL HISTORY:  Significant for bronchial asthma in childhood, attention

deficit hyperactivity disorder, OCD and bipolar.



PAST SURGICAL HISTORY:  Unremarkable.



FAMILY HISTORY:  He has 1 older brother and younger sister, both healthy.  His

father is alive and healthy.  His mother has endocarditis.



SOCIAL HISTORY:  He is not  and has no children.  He does not drink

alcohol or use marijuana for the last 8 years.  Nobody else in the family has

similar symptoms.



REVIEW OF SYSTEMS:  As in history of present illness.



MEDICATIONS:  He is currently on following medications:  He is on azithromycin

and prednisone 50 mg daily.



PHYSICAL EXAMINATION:

GENERAL:  On arrival to the Emergency Room, he looked well and was clearly in no

apparent respiratory distress.  No pallor, jaundice, cyanosis or thyromegaly. 

No jugular venous distention.  No limb edema.

VITAL SIGNS:  His heart rate was 85, blood pressure was 119/62, temperature was

97.9, respiratory rate was 18 and oxygen saturation was 96%.

HEAD, EYES, EARS, NOSE AND THROAT:  Normocephalic, atraumatic.

NECK:  Supple.

HEART:  Showed normal first and second heart sounds.  No gallop, rub or murmur.

CHEST:  Clear to auscultation.  No crepitation or rhonchi.

ABDOMEN:  Distended, soft, nontender.

NEUROLOGIC:  He is awake, alert, responding appropriately.  All cranial nerves

intact.

EXTREMITIES:  He moves extremities without difficulty.



LABORATORY DATA:  Showed a white cell count 9300, hemoglobin 14, hematocrit 42,

MCV 91, and platelet count of 96,000.  Serum sodium was 143, potassium 3.6,

chloride 106, bicarbonate 27, anion gap of 10, BUN 14, creatinine 1, estimated

GFR was 93 mL per minute.  His glucose was 118, calcium was 9.1.  Total

bilirubin, AST, ALT, alkaline phosphatase were normal.  Total protein was 7,

albumin 3.9.  His prothrombin time was 11.7, INR 1.1, aPTT was 2.7.



ASSESSMENT AND PLAN:  In summary, there is asthma exacerbation together with

marijuana-induced cyclical vomiting.  Continue with IV fluid.





______________________________

RAJANI LING MD



DR:  ALYSE/joe  JOB#:  [id number] / [id number]

DD:  08/11/2019 13:50  DT:  08/11/2019 14:29

## 2019-08-12 VITALS — SYSTOLIC BLOOD PRESSURE: 125 MMHG | DIASTOLIC BLOOD PRESSURE: 73 MMHG

## 2019-08-12 VITALS — DIASTOLIC BLOOD PRESSURE: 70 MMHG | SYSTOLIC BLOOD PRESSURE: 114 MMHG

## 2019-08-12 VITALS — DIASTOLIC BLOOD PRESSURE: 59 MMHG | SYSTOLIC BLOOD PRESSURE: 115 MMHG

## 2019-08-12 LAB
ALBUMIN SERPL-MCNC: 3.7 G/DL (ref 3.4–5)
ALBUMIN/GLOB SERPL: 1.2 {RATIO} (ref 1–1.7)
ALP SERPL-CCNC: 57 U/L (ref 46–116)
ALT SERPL-CCNC: 24 U/L (ref 16–63)
ANION GAP SERPL CALC-SCNC: 9 MMOL/L (ref 6–14)
AST SERPL-CCNC: 11 U/L (ref 15–37)
BILIRUB SERPL-MCNC: 0.9 MG/DL (ref 0.2–1)
BUN/CREAT SERPL: 11 (ref 6–20)
CA-I SERPL ISE-MCNC: 13 MG/DL (ref 8–26)
CALCIUM SERPL-MCNC: 8.9 MG/DL (ref 8.5–10.1)
CHLORIDE SERPL-SCNC: 106 MMOL/L (ref 98–107)
CO2 SERPL-SCNC: 28 MMOL/L (ref 21–32)
CREAT SERPL-MCNC: 1.2 MG/DL (ref 0.7–1.3)
ERYTHROCYTE [DISTWIDTH] IN BLOOD BY AUTOMATED COUNT: 13.8 % (ref 11.5–14.5)
GFR SERPLBLD BASED ON 1.73 SQ M-ARVRAT: 75.7 ML/MIN
GLOBULIN SER-MCNC: 3 G/DL (ref 2.2–3.8)
GLUCOSE SERPL-MCNC: 96 MG/DL (ref 70–99)
HCT VFR BLD CALC: 44.5 % (ref 39–53)
HGB BLD-MCNC: 15 G/DL (ref 13–17.5)
MCH RBC QN AUTO: 31 PG (ref 25–35)
MCHC RBC AUTO-ENTMCNC: 34 G/DL (ref 31–37)
MCV RBC AUTO: 91 FL (ref 79–100)
PLATELET # BLD AUTO: 191 X10^3/UL (ref 140–400)
POTASSIUM SERPL-SCNC: 3.6 MMOL/L (ref 3.5–5.1)
PROT SERPL-MCNC: 6.7 G/DL (ref 6.4–8.2)
RBC # BLD AUTO: 4.88 X10^6/UL (ref 4.3–5.7)
SODIUM SERPL-SCNC: 143 MMOL/L (ref 136–145)
WBC # BLD AUTO: 8 X10^3/UL (ref 4–11)

## 2019-08-12 RX ADMIN — METHYLPREDNISOLONE SODIUM SUCCINATE SCH MG: 40 INJECTION, POWDER, FOR SOLUTION INTRAMUSCULAR; INTRAVENOUS at 07:32

## 2019-08-12 RX ADMIN — AZITHROMYCIN SCH MG: 250 TABLET, FILM COATED ORAL at 07:32

## 2019-08-12 RX ADMIN — PANTOPRAZOLE SODIUM SCH MG: 40 TABLET, DELAYED RELEASE ORAL at 07:32

## 2019-08-12 RX ADMIN — FAMOTIDINE SCH MG: 10 INJECTION, SOLUTION INTRAVENOUS at 07:32

## 2019-08-12 RX ADMIN — IPRATROPIUM BROMIDE AND ALBUTEROL SULFATE SCH ML: .5; 3 SOLUTION RESPIRATORY (INHALATION) at 05:39

## 2019-08-12 NOTE — EKG
[facility] [address], [place] [address]

Test Date:    2019               Test Time:    18:28:30

Pat Name:     KALYANI BILLS            Department:   

Patient ID:   [id number]           Room:          

Gender:       M                        Technician:   

:          1995               Requested By: EDGAR GARCIA

Order Number: [id number]            Reading MD:     

                                 Measurements

Intervals                              Axis          

Rate:                                  P:            

MN:                                    QRS:          

QRSD:                                  T:            

QT:                                                  

QTc:                                                 

                           Interpretive Statements

## 2019-08-12 NOTE — EKG
[facility] [address], [place] [address]

Test Date:    2019               Test Time:    18:28:30

Pat Name:     KALYANI BILLS            Department:   

Patient ID:   [id number]           Room:         117 A

Gender:       M                        Technician:   

:          1995               Requested By: RAJANI LING

Order Number: [id number]            Reading MD:     

                                 Measurements

Intervals                              Axis          

Rate:                                  P:            

GA:                                    QRS:          

QRSD:                                  T:            

QT:                                                  

QTc:                                                 

                           Interpretive Statements

## 2019-08-12 NOTE — DS
DATE OF DISCHARGE:  08/12/2019



HOSPITAL COURSE:  The patient is a 24-year-old  male patient who was

admitted with chest tightness, wheezing, had also shortness of breath and

vomiting.  He was evaluated twice before for asthma exacerbation, refused

admission.  He now wants to be admitted.  He discontinued his nicotine patch and

has not been using any nicotine substitute.  He states he wants to stop all

nicotine products.  The patient did vomit once after getting home, he was

extensively investigated and was admitted with asthma exacerbation, nausea,

vomiting, probably gastritis, history of tobacco and marijuana use.  He actually

did very well.  He has had no further episodes of nausea and vomiting.  He has

no shortness of breath.  His mostly complaint was because of anxiety.



PHYSICAL EXAMINATION:

GENERAL:  On examining him today, he looked well and was clearly in no apparent

respiratory distress.  No pallor, jaundice, cyanosis or thyromegaly.  No jugular

venous distention.  No limb edema.

VITAL SIGNS:  His heart rate was 101, blood pressure was 115/59, temperature was

98, respiratory rate 20, and oxygen saturation was 98%.

HEAD, EYES, EARS, NOSE AND THROAT:  Showed normocephalic, atraumatic.

NECK:  Supple.

HEART:  Showed normal first and second heart sounds with no gallop, rub or

murmur.

CHEST:  Clear to auscultation.  No crepitation or rhonchi.

ABDOMEN:  Distended, soft, nontender.  No guarding or rigidity.  No

organomegaly.  All hernial orifices intact.  Bowel sounds normal.

NEUROLOGIC:  He is awake, alert, responding appropriately.  All cranial nerves

intact.

EXTREMITIES:  He moves extremities without difficulty, ambulates without

assistance or assistive devices.



LABORATORY DATA:  His lab work this morning showed a white cell count of 8000,

hemoglobin 15, hematocrit 44, MCV 91, and platelet count of 191,000.  His

chemistry showed a serum sodium 143, potassium 3.6, chloride 106, bicarbonate

28, anion gap of 9, BUN 13, creatinine 1.2, estimated GFR was 76 mL per minute,

his glucose was 96, calcium was 8.9.  Total bilirubin, AST, ALT, alkaline

phosphatase were normal.  Total protein was 6.7, albumin 3.7.  His prothrombin

time was 11.7, INR of 1.1, aPTT was 25.



DISCHARGE MEDICATIONS:  The patient was discharged home to continue on his

steroids as well as inhalers.  He was given also Ativan 1 mg 3 times a day for 5

more days and was advised to go to his primary care physician to continue with

antianxiety medication.





______________________________

RAJANI LING MD



DR:  ALYSE/joe  JOB#:  [id number] / [id number]

DD:  08/12/2019 16:05  DT:  08/12/2019 18:42

## 2021-01-23 ENCOUNTER — HOSPITAL ENCOUNTER (EMERGENCY)
Dept: HOSPITAL 63 - ER | Age: 26
Discharge: HOME | End: 2021-01-23
Payer: SELF-PAY

## 2021-01-23 VITALS — DIASTOLIC BLOOD PRESSURE: 88 MMHG | SYSTOLIC BLOOD PRESSURE: 131 MMHG

## 2021-01-23 VITALS — HEIGHT: 73 IN | BODY MASS INDEX: 23.9 KG/M2 | WEIGHT: 180.34 LBS

## 2021-01-23 DIAGNOSIS — N50.811: ICD-10-CM

## 2021-01-23 DIAGNOSIS — F12.90: ICD-10-CM

## 2021-01-23 DIAGNOSIS — F41.9: ICD-10-CM

## 2021-01-23 DIAGNOSIS — J45.909: ICD-10-CM

## 2021-01-23 DIAGNOSIS — F17.210: ICD-10-CM

## 2021-01-23 DIAGNOSIS — K21.9: ICD-10-CM

## 2021-01-23 DIAGNOSIS — N45.1: Primary | ICD-10-CM

## 2021-01-23 LAB
APTT PPP: YELLOW S
BACTERIA #/AREA URNS HPF: (no result) /HPF
BILIRUB UR QL STRIP: (no result)
FIBRINOGEN PPP-MCNC: CLEAR MG/DL
GLUCOSE UR STRIP-MCNC: (no result) MG/DL
NITRITE UR QL STRIP: (no result)
RBC #/AREA URNS HPF: 0 /HPF (ref 0–2)
SP GR UR STRIP: 1.02
SQUAMOUS #/AREA URNS LPF: (no result) /LPF
UROBILINOGEN UR-MCNC: 0.2 MG/DL
WBC #/AREA URNS HPF: 0 /HPF (ref 0–4)

## 2021-01-23 PROCEDURE — 36415 COLL VENOUS BLD VENIPUNCTURE: CPT

## 2021-01-23 PROCEDURE — 87591 N.GONORRHOEAE DNA AMP PROB: CPT

## 2021-01-23 PROCEDURE — 87491 CHLMYD TRACH DNA AMP PROBE: CPT

## 2021-01-23 PROCEDURE — 76870 US EXAM SCROTUM: CPT

## 2021-01-23 PROCEDURE — 81001 URINALYSIS AUTO W/SCOPE: CPT

## 2021-01-23 PROCEDURE — 99284 EMERGENCY DEPT VISIT MOD MDM: CPT

## 2021-01-23 PROCEDURE — 96372 THER/PROPH/DIAG INJ SC/IM: CPT

## 2021-01-23 NOTE — PHYS DOC
Past History


Past Medical History:  Anxiety, Asthma, Bronchitis, GERD


Past Surgical History:  No Surgical History


Smoking:  Cigarettes


Alcohol Use:  Occasionally


Drug Use:  Marijuana





General Adult


HPI:


HPI:


"My Rt.nut been hurting.. the past week.. off and on..after sex last night..."





Patient is a 25 year old male who presents with above hx and complaints of 

tenderness in Rt. testicle off and on past week.  Pt. states is worse tonight. 

Did have sex  yesterday without signficant change in pain.  Pt. on exam appear 

to be in epididymis on right.   Pain radiates into the right lower groin.  Only 

one lifetime sexual partner.  No history of STDs.  No history of recent travel. 

No specific ill contacts.  No history of immunosuppression.  No trauma.





Review of Systems:


Review of Systems:


Constitutional:  Denies fever or chills 


Eyes:  Denies change in visual acuity 


HENT:  Denies nasal congestion or sore throat 


Respiratory:  Denies cough or shortness of breath 


Cardiovascular:  Denies chest pain or edema 


GI:  Denies abdominal pain, nausea, vomiting, bloody stools or diarrhea .  Co

mplains of right testicle pain


: Denies dysuria 


Musculoskeletal:  Denies back pain or joint pain 


Integument:  Denies rash 


Neurologic:  Denies headache, focal weakness or sensory changes 


Endocrine:  Denies polyuria or polydipsia 


Lymphatic:  Denies swollen glands 


Psychiatric:  Denies depression or anxiety





Family History:


Family History:


Noncontributory to presentation





Current Medications:


Current Meds:


See nursing for home meds





Allergies:


Allergies:





Allergies








Coded Allergies Type Severity Reaction Last Updated Verified


 


  No Known Drug Allergies    19 No











Physical Exam:


PE:





Constitutional: Well developed, well nourished, moderate acute distress, non-

toxic appearance. []


HENT: Normocephalic, atraumatic, bilateral external ears normal, oropharynx 

moist, no oral exudates, nose normal. []


Eyes: PERRLA, EOMI, conjunctiva normal, no discharge. [] 


Neck: Normal range of motion, no tenderness, supple, no stridor. [] 


Cardiovascular:Heart rate regular rhythm, no murmur []


Lungs & Thorax:  Bilateral breath sounds at apex on auscultation []


Abdomen: Bowel sounds normal, soft, no tenderness, no masses, no pulsatile 

masses. [] Circumcised male.  Testicle on right hangs higher.  Mild tenderness. 

 Epididymis is very tender to palpation.  No penile discharge.


Skin: Warm, dry, no erythema, no rash. [] 


Back: No tenderness, no CVA tenderness. [] 


Extremities: No tenderness, no cyanosis, no clubbing, ROM intact, no edema.  No 

psoas sign.


Neurologic: Alert and oriented X 3, normal motor function, normal sensory 

function, no focal deficits noted. []


Psychologic: Affect normal, judgement normal, mood normal. []





EKG:


EKG:


[]





Radiology/Procedures:


Radiology/Procedures:


[][facility] [address], [place] [address]


                                 [phone]


                                        


                                 IMAGING REPORT





                                     Signed





PATIENT: KALYANI BILLS  ACCOUNT: [id number]     MRN#: [id number]


: 1995           LOCATION: ER              AGE: 25


SEX: M                    EXAM DT: 21         ACCESSION#: [id number]


STATUS: REG ER            ORD. PHYSICIAN: EDGAR GARCIA MD


REASON: Rt testicle pain


PROCEDURE: TESTICULAR/SCROTUM





EXAMINATION:  US TESTICULAR, 2021 5:08 AM





CLINICAL INDICATION:  Right testicular pain 





TECHNIQUE: Grayscale, color and spectral Doppler ultrasound images of the 

testicles and scrotum.





COMPARISON:  





FINDINGS:  


The right testicle measures 4.7 x 2.8 x 2.7 cm. The left testicle measures 4.2 x

 3.0 x 2.3 cm. Both testicles are homogeneous with normal blood flow. No 

testicular mass. The epididymides are normal in size with normal blood flow. 

There is a 8mm anechoic left epididymal head cyst. No hydrocele.





IMPRESSION: 


1. No acute abnormality.


2. 8 mm left epididymal head cyst. 





Electronically signed by: Renee Murphy MD (2021 6:13 AM) [id number]














DICTATED AND SIGNED BY:     RENEE MURPHY MD


DATE:     2111





CC: EDGAR GARCIA MD; PCP,NO ~MTH0 0





Heart Score:


Risk Factors:


Risk Factors:  DM, Current or recent (<one month) smoker, HTN, HLP, family 

history of CAD, obesity.


Risk Scores:


Score 0 - 3:  2.5% MACE over next 6 weeks - Discharge Home


Score 4 - 6:  20.3% MACE over next 6 weeks - Admit for Clinical Observation


Score 7 - 10:  72.7% MACE over next 6 weeks - Early Invasive Strategies





Course & Med Decision Making:


Course & Med Decision Making


Pertinent Labs and Imaging studies reviewed. (See chart for details)





Ultrasound still pending at shift change.  0615 hrs.  


Patient to take Keflex 500 mg 3 times a day.  Follow-up cultures.  Use ice packs

 as needed.  Follow-up primary care.  Safe sex.





Impression:





1.  Right testicle pain


2.  Right epididymis pain-suspect he has epididymitis








[]





Dragon Disclaimer:


Dragon Disclaimer:


This electronic medical record was generated, in whole or in part, using a voice

 recognition dictation system.





Departure


Departure:


Referrals:  


PCP,SANTI (PCP)


Scripts


Cephalexin (KEFLEX) 750 Mg Capsule


500 MG PO TID for epididymitis for 10 Days, #20 CAP


   Prov: EDGAR GARCIA MD         21





Dragon Disclaimer


This chart was dictated in whole or in part using Voice Recognition software in 

a busy, high-work load, and often noisy Emergency Department environment.  It 

may contain unintended and wholly unrecognized errors or omissions.





Dragon Disclaimer


This chart was dictated in whole or in part using Voice Recognition software in 

a busy, high-work load, and often noisy Emergency Department environment.  It 

may contain unintended and wholly unrecognized errors or omissions.











EDGAR GARCIA MD           2021 04:21

## 2021-01-23 NOTE — RAD
EXAMINATION:  US TESTICULAR, 1/23/2021 5:08 AM



CLINICAL INDICATION:  Right testicular pain 



TECHNIQUE: Grayscale, color and spectral Doppler ultrasound images of the testicles and scrotum.



COMPARISON:  



FINDINGS:  

The right testicle measures 4.7 x 2.8 x 2.7 cm. The left testicle measures 4.2 x 3.0 x 2.3 cm. Both t
esticles are homogeneous with normal blood flow. No testicular mass. The epididymides are normal in s
ize with normal blood flow. There is a 8mm anechoic left epididymal head cyst. No hydrocele.



IMPRESSION: 

1. No acute abnormality.

2. 8 mm left epididymal head cyst. 



Electronically signed by: Renee Murphy MD (1/23/2021 6:13 AM) [id number]